# Patient Record
Sex: MALE | Race: WHITE | NOT HISPANIC OR LATINO | Employment: PART TIME | URBAN - METROPOLITAN AREA
[De-identification: names, ages, dates, MRNs, and addresses within clinical notes are randomized per-mention and may not be internally consistent; named-entity substitution may affect disease eponyms.]

---

## 2024-07-11 ENCOUNTER — OFFICE VISIT (OUTPATIENT)
Age: 19
End: 2024-07-11
Payer: MEDICAID

## 2024-07-11 ENCOUNTER — LAB (OUTPATIENT)
Age: 19
End: 2024-07-11
Payer: MEDICAID

## 2024-07-11 VITALS
BODY MASS INDEX: 18.96 KG/M2 | DIASTOLIC BLOOD PRESSURE: 82 MMHG | WEIGHT: 132.4 LBS | OXYGEN SATURATION: 100 % | HEIGHT: 70 IN | SYSTOLIC BLOOD PRESSURE: 120 MMHG | HEART RATE: 83 BPM

## 2024-07-11 DIAGNOSIS — J45.20 MILD INTERMITTENT ASTHMA, UNSPECIFIED WHETHER COMPLICATED: Primary | ICD-10-CM

## 2024-07-11 DIAGNOSIS — F41.9 ANXIETY: ICD-10-CM

## 2024-07-11 DIAGNOSIS — E78.2 MIXED HYPERLIPIDEMIA: ICD-10-CM

## 2024-07-11 DIAGNOSIS — R73.9 HYPERGLYCEMIA: ICD-10-CM

## 2024-07-11 DIAGNOSIS — R17 ELEVATED BILIRUBIN: ICD-10-CM

## 2024-07-11 DIAGNOSIS — J45.20 MILD INTERMITTENT ASTHMA, UNSPECIFIED WHETHER COMPLICATED: ICD-10-CM

## 2024-07-11 DIAGNOSIS — Z91.018 NUT ALLERGY: ICD-10-CM

## 2024-07-11 DIAGNOSIS — Z22.322 MRSA (METHICILLIN RESISTANT STAPH AUREUS) CULTURE POSITIVE: ICD-10-CM

## 2024-07-11 LAB
ALBUMIN SERPL-MCNC: 4.5 G/DL (ref 3.5–5.2)
ALBUMIN/GLOB SERPL: 1.7 G/DL
ALP SERPL-CCNC: 114 U/L (ref 39–117)
ALT SERPL W P-5'-P-CCNC: 11 U/L (ref 1–41)
ANION GAP SERPL CALCULATED.3IONS-SCNC: 13 MMOL/L (ref 5–15)
AST SERPL-CCNC: 17 U/L (ref 1–40)
BILIRUB SERPL-MCNC: 2.1 MG/DL (ref 0–1.2)
BUN SERPL-MCNC: 9 MG/DL (ref 6–20)
BUN/CREAT SERPL: 8.9 (ref 7–25)
CALCIUM SPEC-SCNC: 10 MG/DL (ref 8.6–10.5)
CHLORIDE SERPL-SCNC: 103 MMOL/L (ref 98–107)
CHOLEST SERPL-MCNC: 133 MG/DL (ref 0–200)
CO2 SERPL-SCNC: 22 MMOL/L (ref 22–29)
CREAT SERPL-MCNC: 1.01 MG/DL (ref 0.76–1.27)
EGFRCR SERPLBLD CKD-EPI 2021: 109.9 ML/MIN/1.73
GLOBULIN UR ELPH-MCNC: 2.7 GM/DL
GLUCOSE SERPL-MCNC: 87 MG/DL (ref 65–99)
HDLC SERPL-MCNC: 43 MG/DL (ref 40–60)
LDLC SERPL CALC-MCNC: 78 MG/DL (ref 0–100)
LDLC/HDLC SERPL: 1.84 {RATIO}
POTASSIUM SERPL-SCNC: 4.2 MMOL/L (ref 3.5–5.2)
PROT SERPL-MCNC: 7.2 G/DL (ref 6–8.5)
SODIUM SERPL-SCNC: 138 MMOL/L (ref 136–145)
TRIGL SERPL-MCNC: 55 MG/DL (ref 0–150)
TSH SERPL DL<=0.05 MIU/L-ACNC: 1.05 UIU/ML (ref 0.27–4.2)
VLDLC SERPL-MCNC: 12 MG/DL (ref 5–40)

## 2024-07-11 PROCEDURE — 80061 LIPID PANEL: CPT | Performed by: STUDENT IN AN ORGANIZED HEALTH CARE EDUCATION/TRAINING PROGRAM

## 2024-07-11 PROCEDURE — 83036 HEMOGLOBIN GLYCOSYLATED A1C: CPT | Performed by: STUDENT IN AN ORGANIZED HEALTH CARE EDUCATION/TRAINING PROGRAM

## 2024-07-11 PROCEDURE — 1160F RVW MEDS BY RX/DR IN RCRD: CPT | Performed by: STUDENT IN AN ORGANIZED HEALTH CARE EDUCATION/TRAINING PROGRAM

## 2024-07-11 PROCEDURE — 1159F MED LIST DOCD IN RCRD: CPT | Performed by: STUDENT IN AN ORGANIZED HEALTH CARE EDUCATION/TRAINING PROGRAM

## 2024-07-11 PROCEDURE — 80050 GENERAL HEALTH PANEL: CPT | Performed by: STUDENT IN AN ORGANIZED HEALTH CARE EDUCATION/TRAINING PROGRAM

## 2024-07-11 PROCEDURE — 36415 COLL VENOUS BLD VENIPUNCTURE: CPT | Performed by: STUDENT IN AN ORGANIZED HEALTH CARE EDUCATION/TRAINING PROGRAM

## 2024-07-11 RX ORDER — DOXYCYCLINE HYCLATE 100 MG/1
100 CAPSULE ORAL 2 TIMES DAILY
Qty: 10 CAPSULE | Refills: 0 | Status: SHIPPED | OUTPATIENT
Start: 2024-07-11 | End: 2024-07-16

## 2024-07-11 RX ORDER — PROPRANOLOL HYDROCHLORIDE 10 MG/1
10 TABLET ORAL 2 TIMES DAILY PRN
Qty: 90 TABLET | Refills: 2 | Status: SHIPPED | OUTPATIENT
Start: 2024-07-11

## 2024-07-11 RX ORDER — EPINEPHRINE 0.3 MG/.3ML
0.3 INJECTION SUBCUTANEOUS ONCE AS NEEDED
Qty: 1 EACH | Refills: 3 | Status: SHIPPED | OUTPATIENT
Start: 2024-07-11

## 2024-07-11 RX ORDER — ALBUTEROL SULFATE 90 UG/1
2 AEROSOL, METERED RESPIRATORY (INHALATION) EVERY 4 HOURS PRN
Qty: 18 G | Refills: 5 | Status: SHIPPED | OUTPATIENT
Start: 2024-07-11

## 2024-07-11 NOTE — PROGRESS NOTES
Office Note     Name: Tyshawn Be    : 2005     MRN: [id number]     Chief Complaint  Shortness of Breath (Pt reports difficulty breathing when outside or running, suspects asthma and is interested in an inhaler) and staff infection (Pt reports reoccurring staff infection on hand and wants referral to derm)    Subjective     History of Present Illness:  Tyshawn Be is a 18 y.o. male who presents today for initial visit to establish care. He has a couple of complaints including dyspnea, panic symptoms/anxiety, nut allergy, and recurrent skin infections.  He has required mupirocin and multiple rounds of antibiotics in the past due to skin infections which grew out MRSA on wound culture per his report. He notes dyspnea with being outside or exerting himself, this is associated with wheezing.  Also reports a nut allergy to almonds and cashews. This has been consistent since childhood. He avoids these foods if possible but notes that every time he has accidentally been exposed to almonds he has had a similar reaction of feeling his throat close up.  He denies ever receiving epinephrine. Does not have an epi pen.     Past Medical History: History reviewed. No pertinent past medical history.    Past Surgical History: History reviewed. No pertinent surgical history.    Immunizations:   There is no immunization history on file for this patient.     Medications:     Current Outpatient Medications:   •  albuterol sulfate  (90 Base) MCG/ACT inhaler, Inhale 2 puffs Every 4 (Four) Hours As Needed for Wheezing or Shortness of Air., Disp: 18 g, Rfl: 5  •  doxycycline (VIBRAMYCIN) 100 MG capsule, Take 1 capsule by mouth 2 (Two) Times a Day for 5 days., Disp: 10 capsule, Rfl: 0  •  EPINEPHrine (EPIPEN) 0.3 MG/0.3ML solution auto-injector injection, Inject 0.3 mL under the skin into the appropriate area as directed 1 (One) Time As Needed (Anaphylaxis) for up to 4 doses., Disp: 1 each, Rfl: 3  •  mupirocin  "(BACTROBAN) 2 % ointment, Apply 1 Application topically to the appropriate area as directed 3 (Three) Times a Day. For up to 2 weeks, Disp: 30 g, Rfl: 5  •  propranolol (INDERAL) 10 MG tablet, Take 1 tablet by mouth 2 (Two) Times a Day As Needed (anxiety)., Disp: 90 tablet, Rfl: 2    Allergies:   Allergies   Allergen Reactions   • Penicillins Hives       Family History:   Family History   Problem Relation Age of Onset   • Hypertension Mother    • Stroke Mother    • Cancer Maternal Grandmother        Social History:   Social History     Socioeconomic History   • Marital status: Single   Tobacco Use   • Smoking status: Never     Passive exposure: Never   • Smokeless tobacco: Never   Vaping Use   • Vaping status: Some Days   • Substances: Nicotine, Flavoring   • Devices: Disposable   Substance and Sexual Activity   • Drug use: Never   • Sexual activity: Defer         Objective     Vital Signs  /82   Pulse 83   Ht 177.5 cm (69.88\")   Wt 60.1 kg (132 lb 6.4 oz)   SpO2 100%   BMI 19.06 kg/m²   Estimated body mass index is 19.06 kg/m² as calculated from the following:    Height as of this encounter: 177.5 cm (69.88\").    Weight as of this encounter: 60.1 kg (132 lb 6.4 oz).    Pediatric BMI = 6 %ile (Z= -1.59) based on CDC (Boys, 2-20 Years) BMI-for-age based on BMI available as of 7/11/2024.. BMI is within normal parameters. No other follow-up for BMI required.      Physical Exam  Constitutional:       General: He is not in acute distress.     Appearance: He is not toxic-appearing.   Cardiovascular:      Rate and Rhythm: Normal rate and regular rhythm.      Heart sounds: No murmur heard.     No friction rub. No gallop.   Pulmonary:      Effort: Pulmonary effort is normal.      Breath sounds: Normal breath sounds.   Abdominal:      General: Abdomen is flat. There is no distension.   Skin:     General: Skin is warm and dry.      Comments: Pustular lesions on hands bilaterally, small   Neurological:      Mental " Status: He is alert.   Psychiatric:         Mood and Affect: Mood normal.         Behavior: Behavior normal.          Assessment and Plan     1. Mild intermittent asthma, unspecified whether complicated  Rx albuterol  -Refer to allergy for nut allergy as below, will hold off on separate PFT as they can do this at the allergy office  - albuterol sulfate  (90 Base) MCG/ACT inhaler; Inhale 2 puffs Every 4 (Four) Hours As Needed for Wheezing or Shortness of Air.  Dispense: 18 g; Refill: 5    2. MRSA (methicillin resistant staph aureus) culture positive  Chronic uncontrolled  -Multiple active lesions despite mupirocin  -Refer to derm, short course of doxy, refill mupirocin  - Ambulatory Referral to Dermatology  - doxycycline (VIBRAMYCIN) 100 MG capsule; Take 1 capsule by mouth 2 (Two) Times a Day for 5 days.  Dispense: 10 capsule; Refill: 0  - mupirocin (BACTROBAN) 2 % ointment; Apply 1 Application topically to the appropriate area as directed 3 (Three) Times a Day. For up to 2 weeks  Dispense: 30 g; Refill: 5    3. Nut allergy  Symptoms concerning for a true nut allergy, almonds, cashews but not peanuts reported  -Refer to allergy for testing  -Rx epipen  - Ambulatory Referral to Allergy  - EPINEPHrine (EPIPEN) 0.3 MG/0.3ML solution auto-injector injection; Inject 0.3 mL under the skin into the appropriate area as directed 1 (One) Time As Needed (Anaphylaxis) for up to 4 doses.  Dispense: 1 each; Refill: 3    4. Hyperglycemia  Check labs  - Hemoglobin A1c; Future    5. Anxiety  Chronic uncontrolled with prominent physical symptoms and panic  -Refer to therapy, trial PRN propranolol  - Ambulatory Referral to Behavioral Health  - propranolol (INDERAL) 10 MG tablet; Take 1 tablet by mouth 2 (Two) Times a Day As Needed (anxiety).  Dispense: 90 tablet; Refill: 2  - TSH Rfx On Abnormal To Free T4; Future    6. Mixed hyperlipidemia  Check labs  - Comprehensive Metabolic Panel; Future  - CBC Auto Differential; Future  -  Lipid Panel; Future       Counseling was given to patient for the following topics: instructions for management.    Follow Up  Return in about 4 weeks (around 8/8/2024) for Video visit.    MD SUSAN [facility]  [address] [address]  [phone]

## 2024-07-12 LAB
BASOPHILS # BLD AUTO: 0.04 10*3/MM3 (ref 0–0.2)
BASOPHILS NFR BLD AUTO: 0.8 % (ref 0–1.5)
DEPRECATED RDW RBC AUTO: 39.4 FL (ref 37–54)
EOSINOPHIL # BLD AUTO: 0.32 10*3/MM3 (ref 0–0.4)
EOSINOPHIL NFR BLD AUTO: 6.4 % (ref 0.3–6.2)
ERYTHROCYTE [DISTWIDTH] IN BLOOD BY AUTOMATED COUNT: 12 % (ref 12.3–15.4)
HBA1C MFR BLD: 5 % (ref 4.8–5.6)
HCT VFR BLD AUTO: 46.6 % (ref 37.5–51)
HGB BLD-MCNC: 15.6 G/DL (ref 13–17.7)
IMM GRANULOCYTES # BLD AUTO: 0.01 10*3/MM3 (ref 0–0.05)
IMM GRANULOCYTES NFR BLD AUTO: 0.2 % (ref 0–0.5)
LYMPHOCYTES # BLD AUTO: 1.96 10*3/MM3 (ref 0.7–3.1)
LYMPHOCYTES NFR BLD AUTO: 39 % (ref 19.6–45.3)
MCH RBC QN AUTO: 30.2 PG (ref 26.6–33)
MCHC RBC AUTO-ENTMCNC: 33.5 G/DL (ref 31.5–35.7)
MCV RBC AUTO: 90.3 FL (ref 79–97)
MONOCYTES # BLD AUTO: 0.54 10*3/MM3 (ref 0.1–0.9)
MONOCYTES NFR BLD AUTO: 10.8 % (ref 5–12)
NEUTROPHILS NFR BLD AUTO: 2.15 10*3/MM3 (ref 1.7–7)
NEUTROPHILS NFR BLD AUTO: 42.8 % (ref 42.7–76)
NRBC BLD AUTO-RTO: 0 /100 WBC (ref 0–0.2)
PLATELET # BLD AUTO: 251 10*3/MM3 (ref 140–450)
PMV BLD AUTO: 10.8 FL (ref 6–12)
RBC # BLD AUTO: 5.16 10*6/MM3 (ref 4.14–5.8)
WBC NRBC COR # BLD AUTO: 5.02 10*3/MM3 (ref 3.4–10.8)

## 2024-07-16 ENCOUNTER — PATIENT MESSAGE (OUTPATIENT)
Age: 19
End: 2024-07-16
Payer: MEDICAID

## 2024-08-08 ENCOUNTER — TELEMEDICINE (OUTPATIENT)
Age: 19
End: 2024-08-08
Payer: MEDICAID

## 2024-08-08 DIAGNOSIS — F32.A DEPRESSION, UNSPECIFIED DEPRESSION TYPE: ICD-10-CM

## 2024-08-08 DIAGNOSIS — F41.9 ANXIETY: ICD-10-CM

## 2024-08-08 DIAGNOSIS — G43.009 MIGRAINE WITHOUT AURA AND WITHOUT STATUS MIGRAINOSUS, NOT INTRACTABLE: ICD-10-CM

## 2024-08-08 DIAGNOSIS — R17 ELEVATED BILIRUBIN: Primary | ICD-10-CM

## 2024-08-08 PROCEDURE — 1159F MED LIST DOCD IN RCRD: CPT | Performed by: STUDENT IN AN ORGANIZED HEALTH CARE EDUCATION/TRAINING PROGRAM

## 2024-08-08 PROCEDURE — 1160F RVW MEDS BY RX/DR IN RCRD: CPT | Performed by: STUDENT IN AN ORGANIZED HEALTH CARE EDUCATION/TRAINING PROGRAM

## 2024-08-08 PROCEDURE — 99213 OFFICE O/P EST LOW 20 MIN: CPT | Performed by: STUDENT IN AN ORGANIZED HEALTH CARE EDUCATION/TRAINING PROGRAM

## 2024-08-08 RX ORDER — SUMATRIPTAN 50 MG/1
50 TABLET, FILM COATED ORAL
Qty: 9 TABLET | Refills: 3 | Status: SHIPPED | OUTPATIENT
Start: 2024-08-08

## 2024-08-08 RX ORDER — BUPROPION HYDROCHLORIDE 150 MG/1
150 TABLET ORAL DAILY
Qty: 90 TABLET | Refills: 2 | Status: SHIPPED | OUTPATIENT
Start: 2024-08-08

## 2024-08-09 NOTE — PROGRESS NOTES
Office Note     Name: Tyshawn Be    : 2005     MRN: [id number]     Chief Complaint  No chief complaint on file.    Subjective     History of Present Illness:  Tyshawn Be is a 18 y.o. male who presents today for follow-up of chronic medical conditions including depression and anxiety via telehealth.  He reports that the propranolol is working for his symptoms.  He has used this 4-5 times and does notice a difference.  He continues to have some persistent depressive symptoms and difficulty with focus.    Past Medical History:   Past Medical History:   Diagnosis Date    ADHD (attention deficit hyperactivity disorder)     Anxiety     Headache        Past Surgical History: History reviewed. No pertinent surgical history.    Immunizations:   Immunization History   Administered Date(s) Administered    DTaP / IPV 2009    Hep A, 2 Dose 10/19/2012, 2016    Hpv9 2016    Influenza LAIV (Nasal) 10/19/2012    MCV4 Unspecified 2016    MMR 2009    Tdap 2016    Varicella 2009        Medications:     Current Outpatient Medications:     albuterol sulfate  (90 Base) MCG/ACT inhaler, Inhale 2 puffs Every 4 (Four) Hours As Needed for Wheezing or Shortness of Air., Disp: 18 g, Rfl: 5    buPROPion XL (Wellbutrin XL) 150 MG 24 hr tablet, Take 1 tablet by mouth Daily., Disp: 90 tablet, Rfl: 2    EPINEPHrine (EPIPEN) 0.3 MG/0.3ML solution auto-injector injection, Inject 0.3 mL under the skin into the appropriate area as directed 1 (One) Time As Needed (Anaphylaxis) for up to 4 doses., Disp: 1 each, Rfl: 3    mupirocin (BACTROBAN) 2 % ointment, Apply 1 Application topically to the appropriate area as directed 3 (Three) Times a Day. For up to 2 weeks, Disp: 30 g, Rfl: 5    propranolol (INDERAL) 10 MG tablet, Take 1 tablet by mouth 2 (Two) Times a Day As Needed (anxiety)., Disp: 90 tablet, Rfl: 2    SUMAtriptan (IMITREX) 50 MG tablet, Take 1 tablet by mouth Every  "2 (Two) Hours As Needed for Migraine. Max dose 200 mg per day, Disp: 9 tablet, Rfl: 3    Allergies:   Allergies   Allergen Reactions    Penicillins Hives       Family History:   Family History   Problem Relation Age of Onset    Hypertension Mother     Stroke Mother     Depression Mother     Mental illness Mother     Cancer Maternal Grandmother     Hypertension Paternal Uncle        Social History:   Social History     Socioeconomic History    Marital status: Single   Tobacco Use    Smoking status: Some Days     Current packs/day: 0.25     Average packs/day: 0.3 packs/day for 1 year (0.3 ttl pk-yrs)     Types: Cigars, Cigarettes     Passive exposure: Never    Smokeless tobacco: Never   Vaping Use    Vaping status: Some Days    Substances: Nicotine, Flavoring    Devices: Disposable   Substance and Sexual Activity    Alcohol use: Not Currently    Drug use: Not Currently    Sexual activity: Yes         Objective     Vital Signs  There were no vitals taken for this visit.  Estimated body mass index is 19.06 kg/m² as calculated from the following:    Height as of 7/11/24: 177.5 cm (69.88\").    Weight as of 7/11/24: 60.1 kg (132 lb 6.4 oz).    Pediatric BMI = No height and weight on file for this encounter.. BMI is within normal parameters. No other follow-up for BMI required.      Physical Exam  Constitutional:       General: He is not in acute distress.     Appearance: He is not toxic-appearing.   Pulmonary:      Effort: Pulmonary effort is normal. No respiratory distress.   Skin:     General: Skin is warm and dry.   Neurological:      Mental Status: He is alert.   Psychiatric:         Mood and Affect: Mood normal.         Behavior: Behavior normal.          Assessment and Plan     1. Elevated bilirubin  Check US liver, repeat labs pending. Suspect [place] although patient did have a very slight alk phos elevation on most recent labs  - US Liver; Future    2. Anxiety  Chronic stable continue prn propranolol    3. " Depression, unspecified depression type  Chronic uncontrolled, Rx wellbutrin  - buPROPion XL (Wellbutrin XL) 150 MG 24 hr tablet; Take 1 tablet by mouth Daily.  Dispense: 90 tablet; Refill: 2    4. Migraine without aura and without status migrainosus, not intractable  Chronic uncontrolled trial imitrex  - SUMAtriptan (IMITREX) 50 MG tablet; Take 1 tablet by mouth Every 2 (Two) Hours As Needed for Migraine. Max dose 200 mg per day  Dispense: 9 tablet; Refill: 3    This was an audio and video enabled telemedicine encounter. Provider location was [facility]. Patient location was home. Total time of encounter was 15 minutes.        Counseling was given to patient for the following topics: instructions for management.    Follow Up  Return in about 3 months (around 11/8/2024) for Annual physical.    Luis Miguel Anderson MD  [facility]  [address] [address]  [phone]

## 2024-09-05 ENCOUNTER — TELEMEDICINE (OUTPATIENT)
Dept: PSYCHIATRY | Facility: CLINIC | Age: 19
End: 2024-09-05
Payer: MEDICAID

## 2024-09-05 DIAGNOSIS — F33.1 MAJOR DEPRESSIVE DISORDER, RECURRENT EPISODE, MODERATE: Primary | ICD-10-CM

## 2024-09-05 DIAGNOSIS — F41.1 GENERALIZED ANXIETY DISORDER: ICD-10-CM
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
## 2024-09-06 ENCOUNTER — HOSPITAL ENCOUNTER (OUTPATIENT)
Facility: HOSPITAL | Age: 19
Discharge: HOME | End: 2024-09-06
Payer: MEDICAID

## 2024-09-06 DIAGNOSIS — R17 ELEVATED BILIRUBIN: ICD-10-CM

## 2024-09-06 PROCEDURE — 76705 ECHO EXAM OF ABDOMEN: CPT

## 2024-09-09 NOTE — PROGRESS NOTES
Please let the patient know that the ultrasound of his liver showed some moderate fatty liver disease.  Otherwise there is no significant abnormality in his liver ultrasound.  I did review the last labs he had drawn for liver function test and they were normal at that time.

## 2024-10-15 ENCOUNTER — TELEMEDICINE (OUTPATIENT)
Dept: PSYCHIATRY | Facility: CLINIC | Age: 19
End: 2024-10-15
Payer: MEDICAID

## 2024-10-15 DIAGNOSIS — F33.1 MAJOR DEPRESSIVE DISORDER, RECURRENT EPISODE, MODERATE: Primary | ICD-10-CM

## 2024-10-15 DIAGNOSIS — F41.1 GENERALIZED ANXIETY DISORDER: ICD-10-CM

## 2024-10-15 NOTE — PLAN OF CARE
Patient was engaged in identifying treatment goals. Patient expressed that he would like to learn coping strategies to manage his anxiety. Patient noted that he would like to decrease the use of coping skills that he is currently using such as smoking. Patient reported he would also like to learn skills to focus on the present rather than focusing on the past and feeling increased depression.   Problem: Anxiety  Goal: Patient will develop and implement behavioral and cognitive strategies to reduce anxiety and irrational fears.  Outcome: Not Progressing     Problem: Depression  Goal: Patient will demonstrate the ability to initiate new constructive life skills outside of sessions on a consistent basis.  Outcome: Not Progressing

## 2024-10-15 NOTE — TREATMENT PLAN
Multi-Disciplinary Problems (from Behavioral Health Treatment Plan)      Active Problems       Problem: Anxiety  Start Date: 10/15/24      Problem Details: The patient self-scales this problem as a 7 with 10 being the worst. Patient will build coping skills to positively manage symptoms of anxiety and decrease coping skills such as smoking.           Goal Priority Start Date Expected End Date End Date    Patient will develop and implement behavioral and cognitive strategies to reduce anxiety and irrational fears. -- 10/15/24 04/15/25 --    Goal Details: Progress toward goal:  Not appropriate to rate progress toward goal since this is the initial treatment plan.          Goal Intervention Frequency Start Date End Date    Help patient explore past emotional issues in relation to present anxiety. Q3 Weeks 10/15/24 --    Intervention Details: Duration of treatment until discharged.          Goal Intervention Frequency Start Date End Date    Help patient develop an awareness of their cognitive and physical responses to anxiety. Q3 Weeks 10/15/24 --    Intervention Details: Duration of treatment until discharged.                  Problem: Depression  Start Date: 10/15/24      Problem Details: The patient self-scales this problem as a 8 with 10 being the worst. Patient will work to build mindfulness skills to focus on the present than focusing on the past that increased depression symptoms.           Goal Priority Start Date Expected End Date End Date    Patient will demonstrate the ability to initiate new constructive life skills outside of sessions on a consistent basis. -- 10/15/24 04/15/25 --    Goal Details: Progress toward goal:  Not appropriate to rate progress toward goal since this is the initial treatment plan.          Goal Intervention Frequency Start Date End Date    Assist patient in setting attainable activities of daily living goals. PRN 10/15/24 --      Goal Intervention Frequency Start Date End Date     Provide education about depression Q3 Weeks 10/15/24 --    Intervention Details: Duration of treatment until discharged.          Goal Intervention Frequency Start Date End Date    Assist patient in developing healthy coping strategies. Q3 Weeks 10/15/24 --    Intervention Details: Duration of treatment until discharged.                          Reviewed By       Cortes Baker, LCSW 10/15/24 [id number]                     I have discussed and reviewed this treatment plan with the patient.

## 2024-10-15 NOTE — PROGRESS NOTES
"[facility]   Follow-up Progress Note     Date: October 15, 2024  Time In: 10:11  Time Out: 10:56      PROGRESS NOTE  Data:  Tyshawn Be is a 18 y.o. male presenting to [facility] (through [facility]), [address] [place], [address] using a secure MyChart Video Visit through [facility] for assessment with Cortes Baker LCSW. The patient is seen remotely in their  home  using [facility] My Chart. Patient is being seen via telehealth and stated they are in a secure environment for this session. The patient's condition being diagnosed/treated is appropriate for telemedicine. The provider identified herself as well as her credentials. The patient and/or patients guardian consent to be seen remotely, and when consent is given they understand that the consent allows for patient identifiable information to be sent to a third party as needed. They may refuse to be seen remotely at any time. The electronic data is encrypted and password protected, and the patient has been advised of the potential risks to privacy not withstanding such measures.    Today Patient stated he quick his job because he was not receiving sufficient hours. Patient has been working a night shift at [employer]. Patient expressed he feels less anxious not having to interact with customers. Patient stated he expressed this work has been less stressful. Patient stated he has also been able to maintain consistent work hours with his new position. Patient stated he has been trying to maintain a consistent schedule but the past few days patient has lack motivation to do anything. Patient stated his birthday is coming up in a couple of months. Patient stated his mother typically went through a depressive episode during his birthday which his father would blame patient for. Patient stated he has tried to stop vaping but \"instead bought a pack of black and " "milds.\" Engaged patient in identify treatment goals. Patient learned mindfulness breathing.       Clinical Maneuvering/Intervention:    Chief Complaint: anxiety, depression    (Scales based on 0 - 10 with 10 being the worst)  Depression: 7 Anxiety: 4     Assisted Patient in processing above session content; acknowledged and normalized patient’s thoughts, feelings, and concerns.  Rationalized patient thought process regarding identifying treatment goals.  Discussed triggers associated with patient's depression.  Also discussed coping skills for patient to implement such as mindfulness breathing.    Allowed Patient to freely discuss issues  without interruption or judgement with unconditional positive regard, active listening skills, and empathy. Therapist provided a safe, confidential environment to facilitate the development of a positive therapeutic relationship and encouraged open, honest communication. Assisted Patient in identifying risk factors which would indicate the need for higher level of care including thoughts to harm self or others and/or self-harming behavior and encouraged Patient to contact this office, call 911, or present to the nearest emergency room should any of these events occur. Discussed crisis intervention services and means to access. Patient adamantly and convincingly denies current suicidal or homicidal ideation or perceptual disturbance. Assisted Patient in processing session content; acknowledged and normalized Patient’s thoughts, feelings, and concerns by utilizing a person-centered approach in efforts to build appropriate rapport and a positive therapeutic relationship with open and honest communication. Therapist utilized dialectical behavior techniques to teach and model emotional regulation and relaxation methods. Therapist assisted Patient with identifying and implementing healthier coping strategies.     Assessment   Patient appears to be experiencing heightened anxiety and " depression in response to COVID-19 epidemic  As a result, they can be reasonably expected to continue to benefit from treatment and would likely be at increased risk for decompensation otherwise.    Mental Status Exam:   Hygiene:   good  Cooperation:  Cooperative  Eye Contact:  Good  Psychomotor Behavior:  Appropriate  Affect:  Full range  Mood:  calm  Speech:  Normal  Thought Process:  Goal directed  Thought Content:  Mood congruent  Suicidal:  None  Homicidal:  None  Hallucinations:  None  Delusion:  None  Memory:  Deficits  Orientation:  Person, Place, Time, and Situation  Reliability:  fair  Insight:  Fair  Judgement:  Good  Impulse Control:  Fair  Physical/Medical Issues:  No      PHQ-Score Total:  PHQ-9 Total Score:        Patient's Support Network Includes:   best friend    Functional Status: Moderate impairment     Progress toward goal: Not at goal    Prognosis: Good with Ongoing Treatment            Impression/Formulation:    VISIT DIAGNOSIS:     ICD-10-CM ICD-9-CM   1. Major depressive disorder, recurrent episode, moderate  F33.1 296.32   2. Generalized anxiety disorder  F41.1 300.02        Patient appeared alert and oriented.  Patient is voluntarily requesting to continue outpatient therapy at [facility].  Patient is receptive to assistance with maintaining a stable lifestyle.  Patient presents with history of anxiety and depression.  Patient is agreeable to attend routine therapy sessions.  Patient expressed desire to maintain stability and participate in the therapeutic process.        Crisis Plan:  Symptoms and/or behaviors to indicate a crisis: Feeling sad or low    What calming techniques or other strategies will patient use to de-esclate and stay safe: slow down, breathe, visualize calming self, think it though, listen to music, change focus, take a walk    Who is one person patient can contact to assist with de-escalation? Best friend    If symptoms/behaviors  persist, Patient will present to the nearest hospital for an assessment. Advised patient of [facility] ER and assessment services.     Plan:   Patient will continue in individual outpatient therapy with focus on improved functioning and coping skills, maintaining stability, and avoiding decompensation and the need for higher level of care.    Patient will contact this office ([facility] at [phone]), call 911 or present to the nearest emergency room should suicidal or homicidal ideations occur. Provide Cognitive Behavioral Therapy and Solution Focused Therapy to improve functioning, maintain stability, and avoid decompensation and the need for higher level of care.     Return in about 3 weeks, or earlier if symptoms worsen or fail to improve.    Recommended Referrals: none        This document has been electronically signed by Cortes Baker LCSW  October 15, 2024 10:10 EDT        Part of this note may be an electronic transcription/translation of spoken language to printed text using the Dragon Dictation System.

## 2024-11-03 ENCOUNTER — PATIENT MESSAGE (OUTPATIENT)
Age: 19
End: 2024-11-03
Payer: MEDICAID

## 2024-11-04 RX ORDER — DOXYCYCLINE 100 MG/1
100 CAPSULE ORAL 2 TIMES DAILY
Qty: 10 CAPSULE | Refills: 0 | Status: SHIPPED | OUTPATIENT
Start: 2024-11-04 | End: 2024-11-09

## 2024-11-22 ENCOUNTER — TELEMEDICINE (OUTPATIENT)
Dept: PSYCHIATRY | Facility: CLINIC | Age: 19
End: 2024-11-22
Payer: MEDICAID

## 2024-11-22 DIAGNOSIS — F33.1 MAJOR DEPRESSIVE DISORDER, RECURRENT EPISODE, MODERATE: Primary | ICD-10-CM

## 2024-11-22 DIAGNOSIS — F41.1 GENERALIZED ANXIETY DISORDER: ICD-10-CM

## 2024-11-22 NOTE — PROGRESS NOTES
[facility]   Follow-up Progress Note     Date: November 22, 2024  Time In: 8:35  Time Out: 9:04      PROGRESS NOTE  Data:  Tyshawn Be is a 18 y.o. male presenting to [facility] (through [facility]), [address], [place], [address] using a secure MyChart Video Visit through [facility] for assessment with Cortes Baker LCSW. The patient is seen remotely in their  home  using [facility] My Chart. Patient is being seen via telehealth and stated they are in a secure environment for this session. The patient's condition being diagnosed/treated is appropriate for telemedicine. The provider identified herself as well as her credentials. The patient and/or patients guardian consent to be seen remotely, and when consent is given they understand that the consent allows for patient identifiable information to be sent to a third party as needed. They may refuse to be seen remotely at any time. The electronic data is encrypted and password protected, and the patient has been advised of the potential risks to privacy not withstanding such measures.    Today Patient expressed he has been doing well. Patient stated his job at [employer] has been going well and he has been receiving the hours he needs. Patient stated he has been utilizing deep breathing to regulate his anxiety. Patient he wants to work on managing his anger. Patient stated he has observed that when he feels someone is getting an attitude with him he will become angry with that individual. Discussed with patient utilizing mindfulness breathing before he reacts to be able to remain calm and make a choice on how to respond.       Clinical Maneuvering/Intervention:    Chief Complaint: anxiety, depression    (Scales based on 0 - 10 with 10 being the worst)  Depression: 3 Anxiety: 7     Assisted Patient in processing above session content; acknowledged and normalized  patient’s thoughts, feelings, and concerns.  Rationalized patient thought process regarding managing how to respond when someone gets angry with patient.  Discussed triggers associated with patient's depression.  Also discussed coping skills for patient to implement such as mindfulness breathing.    Allowed Patient to freely discuss issues  without interruption or judgement with unconditional positive regard, active listening skills, and empathy. Therapist provided a safe, confidential environment to facilitate the development of a positive therapeutic relationship and encouraged open, honest communication. Assisted Patient in identifying risk factors which would indicate the need for higher level of care including thoughts to harm self or others and/or self-harming behavior and encouraged Patient to contact this office, call 911, or present to the nearest emergency room should any of these events occur. Discussed crisis intervention services and means to access. Patient adamantly and convincingly denies current suicidal or homicidal ideation or perceptual disturbance. Assisted Patient in processing session content; acknowledged and normalized Patient’s thoughts, feelings, and concerns by utilizing a person-centered approach in efforts to build appropriate rapport and a positive therapeutic relationship with open and honest communication. Therapist utilized dialectical behavior techniques to teach and model emotional regulation and relaxation methods. Therapist assisted Patient with identifying and implementing healthier coping strategies.     Assessment   Patient appears to be experiencing heightened anxiety and depression in response to COVID-19 epidemic  As a result, they can be reasonably expected to continue to benefit from treatment and would likely be at increased risk for decompensation otherwise.    Mental Status Exam:   Hygiene:   good  Cooperation:  Cooperative  Eye Contact:  Good  Psychomotor Behavior:   Appropriate  Affect:  Full range  Mood:  anxious  Speech:  Normal  Thought Process:  Goal directed  Thought Content:  Mood congruent  Suicidal:  None  Homicidal:  None  Hallucinations:  None  Delusion:  None  Memory:  Intact  Orientation:  Person  Reliability:  fair  Insight:  Fair  Judgement:  Good  Impulse Control:  Fair  Physical/Medical Issues:  No      PHQ-Score Total:  PHQ-9 Total Score:        Patient's Support Network Includes:   best friend    Functional Status: Moderate impairment     Progress toward goal: Not at goal    Prognosis: Good with Ongoing Treatment            Impression/Formulation:    VISIT DIAGNOSIS:     ICD-10-CM ICD-9-CM   1. Major depressive disorder, recurrent episode, moderate  F33.1 296.32   2. Generalized anxiety disorder  F41.1 300.02        Patient appeared alert and oriented.  Patient is voluntarily requesting to continue outpatient therapy at [facility].  Patient is receptive to assistance with maintaining a stable lifestyle.  Patient presents with history of anxiety and depression.  Patient is agreeable to attend routine therapy sessions.  Patient expressed desire to maintain stability and participate in the therapeutic process.        Crisis Plan:  Symptoms and/or behaviors to indicate a crisis: Feeling sad or low    What calming techniques or other strategies will patient use to de-esclate and stay safe: slow down, breathe, visualize calming self, think it though, listen to music, change focus, take a walk    Who is one person patient can contact to assist with de-escalation? Best friend    If symptoms/behaviors persist, Patient will present to the nearest hospital for an assessment. Advised patient of [facility] ER and assessment services.     Plan:   Patient will continue in individual outpatient therapy with focus on improved functioning and coping skills, maintaining stability, and avoiding decompensation and the need for higher level  of care.    Patient will contact this office ([facility] at [phone]), call 911 or present to the nearest emergency room should suicidal or homicidal ideations occur. Provide Cognitive Behavioral Therapy and Solution Focused Therapy to improve functioning, maintain stability, and avoid decompensation and the need for higher level of care.     Return in about 3 weeks, or earlier if symptoms worsen or fail to improve.    Recommended Referrals: none        This document has been electronically signed by Cortes Baker LCSW  November 22, 2024 08:35 EST        Part of this note may be an electronic transcription/translation of spoken language to printed text using the Dragon Dictation System.    Mode of Visit: Video  Location of patient: -HOME-  Location of provider: +HOME+  You have chosen to receive care through a telehealth visit.  The patient has signed the video visit consent form.  The visit included audio and video interaction. No technical issues occurred during this visit.

## 2024-12-26 ENCOUNTER — PATIENT MESSAGE (OUTPATIENT)
Age: 19
End: 2024-12-26
Payer: MEDICAID

## 2024-12-30 ENCOUNTER — TELEMEDICINE (OUTPATIENT)
Dept: PSYCHIATRY | Facility: CLINIC | Age: 19
End: 2024-12-30
Payer: MEDICAID

## 2024-12-30 DIAGNOSIS — F41.1 GENERALIZED ANXIETY DISORDER: ICD-10-CM

## 2024-12-30 DIAGNOSIS — F33.1 MAJOR DEPRESSIVE DISORDER, RECURRENT EPISODE, MODERATE: Primary | ICD-10-CM

## 2024-12-30 PROCEDURE — 1160F RVW MEDS BY RX/DR IN RCRD: CPT | Performed by: SOCIAL WORKER

## 2024-12-30 PROCEDURE — 90832 PSYTX W PT 30 MINUTES: CPT | Performed by: SOCIAL WORKER

## 2024-12-30 PROCEDURE — 1159F MED LIST DOCD IN RCRD: CPT | Performed by: SOCIAL WORKER

## 2024-12-30 NOTE — PROGRESS NOTES
"[facility]   Follow-up Progress Note     Date: December 30, 2024  Time In: 2:30  Time Out: 3:05      PROGRESS NOTE  Data:  Tyshawn Be is a 19 y.o. male presenting to [facility] (through [facility]), [address], [place], [address] using a secure MyChart Video Visit through [facility] for assessment with Cortes Baker LCSW. The patient is seen remotely in their  home  using [facility] My Chart. Patient is being seen via telehealth and stated they are in a secure environment for this session. The patient's condition being diagnosed/treated is appropriate for telemedicine. The provider identified herself as well as her credentials. The patient and/or patients guardian consent to be seen remotely, and when consent is given they understand that the consent allows for patient identifiable information to be sent to a third party as needed. They may refuse to be seen remotely at any time. The electronic data is encrypted and password protected, and the patient has been advised of the potential risks to privacy not withstanding such measures.    Today Patient stated he over slept during his previous appointment. Patient expressed morning appointments would be better for his schedule. Patient stated he has been utilizing breathing when he becomes frustrated. Patient stated he needs to work on his response. Engaged patient in how to communicate his needs in moments he becomes irritable. Patient was able to identify how he can utilize \"I feel, I need\" to effectively communicate what he is needing and how he is feeling to others and to himself. 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 Discussed with patient utilizing jounaling to be able to process his emotions and identify how he is feeling.       Clinical Maneuvering/Intervention:    Chief Complaint: anxiety, depression     (Scales based on " 0 - 10 with 10 being the worst)  Depression: 3 Anxiety: 4     Assisted Patient in processing above session content; acknowledged and normalized patient’s thoughts, feelings, and concerns.  Rationalized patient thought process regarding communicating patient's emotional needs.  Discussed triggers associated with patient's anxiety.  Also discussed coping skills for patient to implement such as journaling.    Allowed Patient to freely discuss issues  without interruption or judgement with unconditional positive regard, active listening skills, and empathy. Therapist provided a safe, confidential environment to facilitate the development of a positive therapeutic relationship and encouraged open, honest communication. Assisted Patient in identifying risk factors which would indicate the need for higher level of care including thoughts to harm self or others and/or self-harming behavior and encouraged Patient to contact this office, call 911, or present to the nearest emergency room should any of these events occur. Discussed crisis intervention services and means to access. Patient adamantly and convincingly denies current suicidal or homicidal ideation or perceptual disturbance. Assisted Patient in processing session content; acknowledged and normalized Patient’s thoughts, feelings, and concerns by utilizing a person-centered approach in efforts to build appropriate rapport and a positive therapeutic relationship with open and honest communication. Therapist utilized dialectical behavior techniques to teach and model emotional regulation and relaxation methods. Therapist assisted Patient with identifying and implementing healthier coping strategies.     Assessment   Patient appears to be experiencing heightened anxiety and depression in response to COVID-19 epidemic  As a result, they can be reasonably expected to continue to benefit from treatment and would likely be at increased risk for decompensation  otherwise.    Mental Status Exam:   Hygiene:   good  Cooperation:  Cooperative  Eye Contact:  Good  Psychomotor Behavior:  Appropriate  Affect:  Full range  Mood:  calm  Speech:  Normal  Thought Process:  Goal directed  Thought Content:  Mood congruent  Suicidal:  None  Homicidal:  None  Hallucinations:  None  Delusion:  None  Memory:  Intact  Orientation:  Person, Place, Time, and Situation  Reliability:  fair  Insight:  Fair  Judgement:  Good  Impulse Control:  Fair  Physical/Medical Issues:  No      PHQ-Score Total:  PHQ-9 Total Score:        Patient's Support Network Includes:   best friend    Functional Status: Moderate impairment     Progress toward goal: Not at goal    Prognosis: Good with Ongoing Treatment            Impression/Formulation:    VISIT DIAGNOSIS:     ICD-10-CM ICD-9-CM   1. Major depressive disorder, recurrent episode, moderate  F33.1 296.32   2. Generalized anxiety disorder  F41.1 300.02        Patient appeared alert and oriented.  Patient is voluntarily requesting to continue outpatient therapy at [facility].  Patient is receptive to assistance with maintaining a stable lifestyle.  Patient presents with history of anxiety and depression.  Patient is agreeable to attend routine therapy sessions.  Patient expressed desire to maintain stability and participate in the therapeutic process.        Crisis Plan:  Symptoms and/or behaviors to indicate a crisis: Feeling sad or low    What calming techniques or other strategies will patient use to de-esclate and stay safe: slow down, breathe, visualize calming self, think it though, listen to music, change focus, take a walk    Who is one person patient can contact to assist with de-escalation? Best friend    If symptoms/behaviors persist, Patient will present to the nearest hospital for an assessment. Advised patient of [facility] ER and assessment services.     Plan:   Patient will continue in individual  outpatient therapy with focus on improved functioning and coping skills, maintaining stability, and avoiding decompensation and the need for higher level of care.    Patient will contact this office ([facility] at [phone]), call 911 or present to the nearest emergency room should suicidal or homicidal ideations occur. Provide Cognitive Behavioral Therapy and Solution Focused Therapy to improve functioning, maintain stability, and avoid decompensation and the need for higher level of care.     Return in about 3 weeks, or earlier if symptoms worsen or fail to improve.    Recommended Referrals: none        This document has been electronically signed by Cortes Baker LCSW  December 30, 2024 14:30 EST        Part of this note may be an electronic transcription/translation of spoken language to printed text using the Dragon Dictation System.    Mode of Visit: Video  Location of patient: -HOME-  Location of provider: +HOME+  You have chosen to receive care through a telehealth visit.  The patient has signed the video visit consent form.  The visit included audio and video interaction. No technical issues occurred during this visit.

## 2025-03-06 ENCOUNTER — TELEMEDICINE (OUTPATIENT)
Dept: PSYCHIATRY | Facility: CLINIC | Age: 20
End: 2025-03-06
Payer: MEDICAID

## 2025-03-06 DIAGNOSIS — F33.1 MAJOR DEPRESSIVE DISORDER, RECURRENT EPISODE, MODERATE: Primary | ICD-10-CM

## 2025-03-06 DIAGNOSIS — F41.1 GENERALIZED ANXIETY DISORDER: ICD-10-CM

## 2025-03-06 NOTE — PROGRESS NOTES
"[facility]   Follow-up Progress Note     Date: March 6, 2025  Time In: 10:03  Time Out: 10:42      PROGRESS NOTE  Data:  Tyshawn Be is a 19 y.o. male presenting to [facility] (through [facility]), [address] [place], [address] using a secure MyChart Video Visit through [facility] for assessment with Cortes Baker LCSW. The patient is seen remotely in their  home  using [facility] My Chart. Patient is being seen via telehealth and stated they are in a secure environment for this session. The patient's condition being diagnosed/treated is appropriate for telemedicine. The provider identified herself as well as her credentials. The patient and/or patients guardian consent to be seen remotely, and when consent is given they understand that the consent allows for patient identifiable information to be sent to a third party as needed. They may refuse to be seen remotely at any time. The electronic data is encrypted and password protected, and the patient has been advised of the potential risks to privacy not withstanding such measures.    Today Patient reported he has been doing \"okay.\" Patient stated he has been working and sick that has impacted his ability to maintain scheduled appointments. Patient reported that he has been feeling \"pretty anxious recently.\" Patient stated his depression has not been as much of an issue. Patient stated \"talking to people at work and having to go around people\" has created increased anxiety. Patient expressed concern about \"saying something wrong to somebody or making them upset.\" Patient stated he has also been asked to cover more shifts due to illness creating his work to be short staffed. Patient expressed he has to \"make up a lie\" to avoid being called into work which creates more stress. Patient expressed he enjoys his days off and doesn't want to give them up. Patient " stated he feels obligated to say yes to others. Patient was engaged in reviewing treatment progress.       Clinical Maneuvering/Intervention:    Chief Complaint: depression, anxiety    (Scales based on 0 - 10 with 10 being the worst)  Depression: 8 Anxiety: 6/7     Assisted Patient in processing above session content; acknowledged and normalized patient’s thoughts, feelings, and concerns.  Rationalized patient thought process regarding people pleasing behaviors that are creating increased anxiety.  Discussed triggers associated with patient's anxiety.  Also discussed coping skills for patient to implement such as saying no.    Allowed Patient to freely discuss issues  without interruption or judgement with unconditional positive regard, active listening skills, and empathy. Therapist provided a safe, confidential environment to facilitate the development of a positive therapeutic relationship and encouraged open, honest communication. Assisted Patient in identifying risk factors which would indicate the need for higher level of care including thoughts to harm self or others and/or self-harming behavior and encouraged Patient to contact this office, call 911, or present to the nearest emergency room should any of these events occur. Discussed crisis intervention services and means to access. Patient adamantly and convincingly denies current suicidal or homicidal ideation or perceptual disturbance. Assisted Patient in processing session content; acknowledged and normalized Patient’s thoughts, feelings, and concerns by utilizing a person-centered approach in efforts to build appropriate rapport and a positive therapeutic relationship with open and honest communication. Therapist utilized dialectical behavior techniques to teach and model emotional regulation and relaxation methods. Therapist assisted Patient with identifying and implementing healthier coping strategies.     Assessment   Patient appears to be  experiencing heightened anxiety and depression in response to COVID-19 epidemic  As a result, they can be reasonably expected to continue to benefit from treatment and would likely be at increased risk for decompensation otherwise.    Mental Status Exam:   Hygiene:   good  Cooperation:  Cooperative  Eye Contact:  Good  Psychomotor Behavior:  Appropriate  Affect:  Full range  Mood: anxious  Speech:  Normal  Thought Process:  Goal directed  Thought Content:  Mood congruent  Suicidal:  None  Homicidal:  None  Hallucinations:  None  Delusion:  None  Memory:  Intact  Orientation:  Person, Place, Time, and Situation  Reliability:  fair  Insight:  Good and Fair  Judgement:  Good  Impulse Control:  Fair  Physical/Medical Issues:  No      PHQ-Score Total:  PHQ-9 Total Score:        Patient's Support Network Includes:   best friend    Functional Status: Moderate impairment     Progress toward goal: Not at goal    Prognosis: Good with Ongoing Treatment            Impression/Formulation:    VISIT DIAGNOSIS:     ICD-10-CM ICD-9-CM   1. Major depressive disorder, recurrent episode, moderate  F33.1 296.32   2. Generalized anxiety disorder  F41.1 300.02        Patient appeared alert and oriented.  Patient is voluntarily requesting to continue outpatient therapy at [facility].  Patient is receptive to assistance with maintaining a stable lifestyle.  Patient presents with history of anxiety and depression.  Patient is agreeable to attend routine therapy sessions.  Patient expressed desire to maintain stability and participate in the therapeutic process.        Crisis Plan:  Symptoms and/or behaviors to indicate a crisis: Feeling sad or low    What calming techniques or other strategies will patient use to de-esclate and stay safe: slow down, breathe, visualize calming self, think it though, listen to music, change focus, take a walk    Who is one person patient can contact to assist with  de-escalation? Best friend    If symptoms/behaviors persist, Patient will present to the nearest hospital for an assessment. Advised patient of [facility] and assessment services.     Plan:   Patient will continue in individual outpatient therapy with focus on improved functioning and coping skills, maintaining stability, and avoiding decompensation and the need for higher level of care.    Patient will contact this office ([facility] at [phone]), call 911 or present to the nearest emergency room should suicidal or homicidal ideations occur. Provide Cognitive Behavioral Therapy and Solution Focused Therapy to improve functioning, maintain stability, and avoid decompensation and the need for higher level of care.     Return in about 3 weeks, or earlier if symptoms worsen or fail to improve.    Recommended Referrals: none        This document has been electronically signed by Cortes Baker LCSW  March 6, 2025 10:03 EST        Part of this note may be an electronic transcription/translation of spoken language to printed text using the Dragon Dictation System.    Mode of Visit: Video  Location of patient: -HOME-  Location of provider: +HOME+  You have chosen to receive care through a telehealth visit.  The patient has signed the video visit consent form.  The visit included audio and video interaction. No technical issues occurred during this visit.

## 2025-03-06 NOTE — PLAN OF CARE
Patient was engaged in reviewing treatment progress. Patient expressed slight improvement with anxiety but depression has remained the same. Patient will work to decrease anxiety by focusing on what is within his control over acting in ways to protect others' emotions. Patient will build problem solving skills to decrease negative thought patterns that enable negative behaviors.   Problem: Anxiety 6  Goal: Patient will develop and implement behavioral and cognitive strategies to reduce anxiety and irrational fears.  Outcome: Progressing     Problem: Depression 8  Goal: Patient will demonstrate the ability to initiate new constructive life skills outside of sessions on a consistent basis.  Outcome: Progressing

## 2025-05-01 ENCOUNTER — TELEPHONE (OUTPATIENT)
Dept: PSYCHIATRY | Facility: CLINIC | Age: 20
End: 2025-05-01

## 2025-05-23 ENCOUNTER — TELEPHONE (OUTPATIENT)
Dept: PSYCHIATRY | Facility: CLINIC | Age: 20
End: 2025-05-23

## 2025-07-02 ENCOUNTER — OFFICE VISIT (OUTPATIENT)
Age: 20
End: 2025-07-02
Payer: MEDICAID

## 2025-07-02 VITALS
WEIGHT: 130.06 LBS | SYSTOLIC BLOOD PRESSURE: 118 MMHG | HEIGHT: 70 IN | DIASTOLIC BLOOD PRESSURE: 74 MMHG | OXYGEN SATURATION: 100 % | TEMPERATURE: 98.2 F | HEART RATE: 79 BPM | BODY MASS INDEX: 18.62 KG/M2

## 2025-07-02 DIAGNOSIS — S80.869A INSECT BITE OF LOWER LEG, UNSPECIFIED LATERALITY, INITIAL ENCOUNTER: ICD-10-CM
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
## 2025-07-02 DIAGNOSIS — R23.8 BULLAE: Primary | ICD-10-CM

## 2025-07-02 RX ORDER — METHYLPREDNISOLONE 4 MG/1
TABLET ORAL
Qty: 1 EACH | Refills: 0 | Status: SHIPPED | OUTPATIENT
Start: 2025-07-02

## 2025-07-02 RX ORDER — DOXYCYCLINE 100 MG/1
100 CAPSULE ORAL 2 TIMES DAILY
Qty: 10 CAPSULE | Refills: 0 | Status: SHIPPED | OUTPATIENT
Start: 2025-07-02 | End: 2025-07-07

## 2025-07-02 NOTE — LETTER
July 2, 2025     Patient: Tyshawn Be   YOB: 2005   Date of Visit: 7/2/2025       To Whom It May Concern:    It is my medical opinion that Tyshawn Be may return to work in two days.         Sincerely,        Chloe Owen MD    CC: No Recipients

## 2025-07-02 NOTE — PROGRESS NOTES
"Chief Complaint  Insect Bite/blisters  (2 days  lower legs around ankles,)    Subjective        Tyshawn Be presents to [facility]  History of Present Illness    History of Present Illness  The patient is a 19-year-old male presenting for insect bites and blisters.    Two days ago, he was bitten by chiggers while playing in the grass. Today, he noticed the development of blisters at the site of the bite. He has a history of recurrent staph infections, which have previously manifested as large boils. His last staph infection occurred three weeks ago, for which he sought treatment at an urgent care facility. During this visit, he also reported another bite on his ankle that had turned purple, leading to a suspicion of a tick bite. He was prescribed antibiotics again, but raises concerns about potential antibiotic resistance due to frequent use.     He has been using hydrocortisone cream for relief. The current blisters are similar to those he experiences with mosquito bites, but they have never been this severe. The blisters are tender and itchy, causing him to lose sleep. He has consulted a dermatologist in the past, who informed him of high yeast levels on his face and dandruff. He has not taken any steroids for his condition.      He is requesting a sick note as his job involves walking for 12-hour shifts, which he is currently unable to do.     Objective   Vital Signs:  /74 (BP Location: Left arm, Patient Position: Sitting, Cuff Size: Adult)   Pulse 79   Temp 98.2 °F (36.8 °C) (Temporal)   Ht 177.5 cm (69.88\")   Wt 59 kg (130 lb 1 oz)   SpO2 100%   BMI 18.73 kg/m²   Estimated body mass index is 18.73 kg/m² as calculated from the following:    Height as of this encounter: 177.5 cm (69.88\").    Weight as of this encounter: 59 kg (130 lb 1 oz).    BMI is within normal parameters. No other follow-up for BMI required.      The following portions of the patient's history " were reviewed and updated as appropriate: allergies, current medications, past family history, past medical history, past social history, past surgical history, and problem list.       Physical Exam  Constitutional:       General: He is not in acute distress.     Appearance: He is not ill-appearing.   Pulmonary:      Effort: No respiratory distress.   Skin:     Findings: Lesion (Multiple tense bulla with serous fluid accumulation.  Surrounding erythema at the base.  Tender to palpation.) present.                      Result Review :                Assessment and Plan   Diagnoses and all orders for this visit:    1. Bullae (Primary)  -     Ambulatory Referral to Dermatology  -     methylPREDNISolone (MEDROL) 4 MG dose pack; Take as directed on package instructions.  Dispense: 1 each; Refill: 0  -     doxycycline (VIBRAMYCIN) 100 MG capsule; Take 1 capsule by mouth 2 (Two) Times a Day for 5 days.  Dispense: 10 capsule; Refill: 0    2. Insect bite of lower leg, unspecified laterality, initial encounter  -     methylPREDNISolone (MEDROL) 4 MG dose pack; Take as directed on package instructions.  Dispense: 1 each; Refill: 0  -     doxycycline (VIBRAMYCIN) 100 MG capsule; Take 1 capsule by mouth 2 (Two) Times a Day for 5 days.  Dispense: 10 capsule; Refill: 0             Assessment & Plan  1. Bullae.  - Symptoms include fluid-filled sacs resembling blisters, tender to touch, and very itchy, causing difficulty sleeping.  - Physical examination reveals tense bullae; a photograph of the rash was taken for documentation purposes.  - Prescription for doxycycline to treat possible bacterial infection and Medrol Dosepak will be provided for itching and inflammation; referral to a dermatologist for further evaluation and treatment; a sick note will be provided for the next two days.      Follow Up   Return if symptoms worsen or fail to improve.  Patient was given instructions and counseling regarding his condition or for health  maintenance advice. Please see specific information pulled into the AVS if appropriate.         Patient or patient representative verbalized consent for the use of Ambient Listening during the visit with  Chloe Owen MD for chart documentation. 7/2/2025  14:07 EDT    Electronically signed by Chloe Owen MD, 07/02/25, 2:07 PM EDT.